# Patient Record
Sex: MALE | Race: WHITE
[De-identification: names, ages, dates, MRNs, and addresses within clinical notes are randomized per-mention and may not be internally consistent; named-entity substitution may affect disease eponyms.]

---

## 2017-04-16 ENCOUNTER — HOSPITAL ENCOUNTER (EMERGENCY)
Dept: HOSPITAL 26 - MED | Age: 62
Discharge: HOME | End: 2017-04-16
Payer: MEDICAID

## 2017-04-16 VITALS — DIASTOLIC BLOOD PRESSURE: 90 MMHG | SYSTOLIC BLOOD PRESSURE: 148 MMHG

## 2017-04-16 VITALS — SYSTOLIC BLOOD PRESSURE: 135 MMHG | DIASTOLIC BLOOD PRESSURE: 81 MMHG

## 2017-04-16 VITALS — BODY MASS INDEX: 23.05 KG/M2 | HEIGHT: 64 IN | WEIGHT: 135 LBS

## 2017-04-16 DIAGNOSIS — F17.210: ICD-10-CM

## 2017-04-16 DIAGNOSIS — L02.414: Primary | ICD-10-CM

## 2018-04-09 ENCOUNTER — HOSPITAL ENCOUNTER (EMERGENCY)
Dept: HOSPITAL 1 - ED | Age: 63
LOS: 1 days | Discharge: HOME | End: 2018-04-10
Payer: MEDICAID

## 2018-04-09 VITALS — WEIGHT: 149.98 LBS | BODY MASS INDEX: 25.61 KG/M2 | HEIGHT: 64.02 IN

## 2018-04-09 DIAGNOSIS — H66.91: Primary | ICD-10-CM

## 2018-04-09 DIAGNOSIS — F11.10: ICD-10-CM

## 2018-04-09 DIAGNOSIS — R42: ICD-10-CM

## 2018-04-09 DIAGNOSIS — R51: ICD-10-CM

## 2018-04-10 VITALS — SYSTOLIC BLOOD PRESSURE: 169 MMHG | DIASTOLIC BLOOD PRESSURE: 82 MMHG

## 2019-09-13 ENCOUNTER — HOSPITAL ENCOUNTER (EMERGENCY)
Dept: HOSPITAL 26 - MED | Age: 64
Discharge: HOME | End: 2019-09-13
Payer: MEDICAID

## 2019-09-13 VITALS — SYSTOLIC BLOOD PRESSURE: 186 MMHG | DIASTOLIC BLOOD PRESSURE: 114 MMHG

## 2019-09-13 VITALS — WEIGHT: 142 LBS | BODY MASS INDEX: 26.81 KG/M2 | HEIGHT: 61 IN

## 2019-09-13 VITALS — SYSTOLIC BLOOD PRESSURE: 179 MMHG | DIASTOLIC BLOOD PRESSURE: 87 MMHG

## 2019-09-13 DIAGNOSIS — F17.210: ICD-10-CM

## 2019-09-13 DIAGNOSIS — I10: Primary | ICD-10-CM

## 2019-09-13 LAB
ALBUMIN FLD-MCNC: 3.6 G/DL (ref 3.4–5)
ANION GAP SERPL CALCULATED.3IONS-SCNC: 13 MMOL/L (ref 8–16)
AST SERPL-CCNC: 33 U/L (ref 15–37)
BASOPHILS # BLD AUTO: 0 K/UL (ref 0–0.22)
BASOPHILS NFR BLD AUTO: 0.9 % (ref 0–2)
BILIRUB SERPL-MCNC: 0.3 MG/DL (ref 0–1)
BUN SERPL-MCNC: 15 MG/DL (ref 7–18)
CHLORIDE SERPL-SCNC: 103 MMOL/L (ref 98–107)
CO2 SERPL-SCNC: 30.2 MMOL/L (ref 21–32)
CREAT SERPL-MCNC: 1 MG/DL (ref 0.7–1.3)
EOSINOPHIL # BLD AUTO: 0 K/UL (ref 0–0.4)
EOSINOPHIL NFR BLD AUTO: 0.5 % (ref 0–4)
ERYTHROCYTE [DISTWIDTH] IN BLOOD BY AUTOMATED COUNT: 14.6 % (ref 11.6–13.7)
GFR SERPL CREATININE-BSD FRML MDRD: 97 ML/MIN (ref 90–?)
GLUCOSE SERPL-MCNC: 122 MG/DL (ref 74–106)
HCT VFR BLD AUTO: 40 % (ref 36–52)
HGB BLD-MCNC: 13 G/DL (ref 12–18)
LYMPHOCYTES # BLD AUTO: 1.2 K/UL (ref 2–11.5)
LYMPHOCYTES NFR BLD AUTO: 26 % (ref 20.5–51.1)
MCH RBC QN AUTO: 28 PG (ref 27–31)
MCHC RBC AUTO-ENTMCNC: 33 G/DL (ref 33–37)
MCV RBC AUTO: 86.5 FL (ref 80–94)
MONOCYTES # BLD AUTO: 0.5 K/UL (ref 0.8–1)
MONOCYTES NFR BLD AUTO: 10.8 % (ref 1.7–9.3)
NEUTROPHILS # BLD AUTO: 2.8 K/UL (ref 1.8–7.7)
NEUTROPHILS NFR BLD AUTO: 61.8 % (ref 42.2–75.2)
PLATELET # BLD AUTO: 227 K/UL (ref 140–450)
POTASSIUM SERPL-SCNC: 4.2 MMOL/L (ref 3.5–5.1)
RBC # BLD AUTO: 4.62 MIL/UL (ref 4.2–6.1)
SODIUM SERPL-SCNC: 142 MMOL/L (ref 136–145)
WBC # BLD AUTO: 4.5 K/UL (ref 4.8–10.8)

## 2019-09-13 PROCEDURE — 85025 COMPLETE CBC W/AUTO DIFF WBC: CPT

## 2019-09-13 PROCEDURE — 99283 EMERGENCY DEPT VISIT LOW MDM: CPT

## 2019-09-13 PROCEDURE — 36415 COLL VENOUS BLD VENIPUNCTURE: CPT

## 2019-09-13 PROCEDURE — 80053 COMPREHEN METABOLIC PANEL: CPT

## 2019-09-13 PROCEDURE — 96374 THER/PROPH/DIAG INJ IV PUSH: CPT

## 2019-09-13 PROCEDURE — 84484 ASSAY OF TROPONIN QUANT: CPT

## 2019-09-13 PROCEDURE — 96375 TX/PRO/DX INJ NEW DRUG ADDON: CPT

## 2020-12-22 ENCOUNTER — HOSPITAL ENCOUNTER (EMERGENCY)
Dept: HOSPITAL 26 - MED | Age: 65
Discharge: HOME | End: 2020-12-22
Payer: MEDICAID

## 2020-12-22 VITALS — WEIGHT: 150 LBS | BODY MASS INDEX: 24.99 KG/M2 | HEIGHT: 65 IN

## 2020-12-22 VITALS — SYSTOLIC BLOOD PRESSURE: 169 MMHG | DIASTOLIC BLOOD PRESSURE: 92 MMHG

## 2020-12-22 VITALS — SYSTOLIC BLOOD PRESSURE: 185 MMHG | DIASTOLIC BLOOD PRESSURE: 92 MMHG

## 2020-12-22 DIAGNOSIS — I10: ICD-10-CM

## 2020-12-22 DIAGNOSIS — L02.413: Primary | ICD-10-CM

## 2020-12-22 PROCEDURE — 90471 IMMUNIZATION ADMIN: CPT

## 2020-12-22 PROCEDURE — 10060 I&D ABSCESS SIMPLE/SINGLE: CPT

## 2020-12-22 PROCEDURE — 90715 TDAP VACCINE 7 YRS/> IM: CPT

## 2020-12-22 PROCEDURE — 99283 EMERGENCY DEPT VISIT LOW MDM: CPT

## 2020-12-22 NOTE — NUR
Patient discharged with v/s stable. Written and verbal after care instructions 
given and explained. 

Patient alert, oriented and verbalized understanding of instructions. 
Ambulatory with steady gait. All questions addressed prior to discharge. ID 
band removed. Patient advised to follow up with PMD. Rx of BACTRIM, AMLODIPINE 
given. Patient educated on indication of medication including possible reaction 
and side effects. Opportunity to ask questions provided and answered.

## 2021-02-15 ENCOUNTER — HOSPITAL ENCOUNTER (EMERGENCY)
Dept: HOSPITAL 26 - MED | Age: 66
Discharge: HOME | End: 2021-02-15
Payer: MEDICAID

## 2021-02-15 VITALS — HEIGHT: 63 IN | BODY MASS INDEX: 28.35 KG/M2 | WEIGHT: 160 LBS

## 2021-02-15 VITALS — SYSTOLIC BLOOD PRESSURE: 180 MMHG | DIASTOLIC BLOOD PRESSURE: 94 MMHG

## 2021-02-15 DIAGNOSIS — F12.10: ICD-10-CM

## 2021-02-15 DIAGNOSIS — I10: Primary | ICD-10-CM

## 2021-02-15 DIAGNOSIS — F17.210: ICD-10-CM

## 2021-02-15 DIAGNOSIS — Z76.0: ICD-10-CM

## 2021-02-15 DIAGNOSIS — Z71.6: ICD-10-CM

## 2021-02-15 NOTE — NUR
Patient discharged with v/s stable. Written and verbal after care instructions 
ABOUT  HYPERTENSION AND ED MEDICATION REFILL given and explained. 

Patient alert, oriented and verbalized understanding of instructions. 
Ambulatory with steady gait. All questions addressed prior to discharge. ID 
band removed. Patient advised to follow up with PMD. Rx of AMLODIPINE given. 
Patient educated on indication of medication including possible reaction and 
side effects. Opportunity to ask questions provided and answered.

## 2021-02-15 NOTE — NUR
65 YEAR OLD MALE PRESENTED TO ER FOR PRESCRIPTION REFILL; CURRENTLY SEEKING NEW 
PRIMARY PHYSICIAN. DENIES HEADACHE, CHEST PAIN, SOB. AO4, BREATHING EVEN AND 
UNLABORED, SKIN WARM AND DRY. BED IN LOWEST POSITION, LOCKED, X1 SIDERAIL UP.



PMH - HTN



NKA

## 2021-08-15 ENCOUNTER — HOSPITAL ENCOUNTER (EMERGENCY)
Dept: HOSPITAL 26 - MED | Age: 66
Discharge: HOME | End: 2021-08-15
Payer: MEDICAID

## 2021-08-15 VITALS — HEIGHT: 64 IN | BODY MASS INDEX: 26.63 KG/M2 | WEIGHT: 156 LBS

## 2021-08-15 VITALS — SYSTOLIC BLOOD PRESSURE: 152 MMHG | DIASTOLIC BLOOD PRESSURE: 96 MMHG

## 2021-08-15 VITALS — SYSTOLIC BLOOD PRESSURE: 160 MMHG | DIASTOLIC BLOOD PRESSURE: 101 MMHG

## 2021-08-15 DIAGNOSIS — I10: ICD-10-CM

## 2021-08-15 DIAGNOSIS — B85.0: Primary | ICD-10-CM

## 2021-08-15 DIAGNOSIS — Z79.899: ICD-10-CM

## 2021-08-15 NOTE — NUR
65 YO/M BIB SELF W CO TINY ITCHY ORANGE DOTS THROUGHOUT SCALP X2DAYS. PATIENT 
DENIES ANY PAIN. SCRATCHES AND PIN POINT SCABS NOTED THROUGHOUT SCALP. DENIES 
ANY INJURIES. PATIENT SITTING IN BED LOCKED IN LOWEST POSITION, X1 SIDERAIL UP 
FOR PATIENT SAFETY. BREATHING EVEN AND UNLABORED. WILL CONTINUE TO MONITOR. 





PMH: HTN, ARTHRITIS

NKA

## 2021-09-11 ENCOUNTER — HOSPITAL ENCOUNTER (EMERGENCY)
Dept: HOSPITAL 26 - MED | Age: 66
Discharge: HOME | End: 2021-09-11
Payer: MEDICAID

## 2021-09-11 VITALS — SYSTOLIC BLOOD PRESSURE: 216 MMHG | DIASTOLIC BLOOD PRESSURE: 106 MMHG

## 2021-09-11 VITALS — WEIGHT: 160 LBS | HEIGHT: 64 IN | BODY MASS INDEX: 27.31 KG/M2

## 2021-09-11 VITALS — SYSTOLIC BLOOD PRESSURE: 210 MMHG | DIASTOLIC BLOOD PRESSURE: 105 MMHG

## 2021-09-11 DIAGNOSIS — B85.0: Primary | ICD-10-CM

## 2021-09-11 NOTE — NUR
Patient discharged with v/s stable. Written and verbal after care instructions 
given and explained. 

Patient alert, oriented and verbalized understanding of instructions. 
Ambulatory with steady gait. All questions addressed prior to discharge. ID 
band removed. Patient advised to follow up with PMD. Rx of Spinosad given. 
Patient educated on indication of medication including possible reaction and 
side effects. Opportunity to ask questions provided and answered.

## 2021-11-06 ENCOUNTER — HOSPITAL ENCOUNTER (EMERGENCY)
Dept: HOSPITAL 26 - MED | Age: 66
Discharge: HOME | End: 2021-11-06
Payer: MEDICAID

## 2021-11-06 VITALS — DIASTOLIC BLOOD PRESSURE: 91 MMHG | SYSTOLIC BLOOD PRESSURE: 194 MMHG

## 2021-11-06 VITALS — WEIGHT: 162 LBS | HEIGHT: 64 IN | BODY MASS INDEX: 27.66 KG/M2

## 2021-11-06 DIAGNOSIS — I10: ICD-10-CM

## 2021-11-06 DIAGNOSIS — F17.200: ICD-10-CM

## 2021-11-06 DIAGNOSIS — G51.0: Primary | ICD-10-CM

## 2021-11-06 NOTE — NUR
Patient discharged with v/s stable. Written and verbal after care instructions 
given and explained. 

Patient alert, oriented and verbalized understanding of instructions. 
Ambulatory with steady gait. All questions addressed prior to discharge. ID 
band removed. Patient advised to follow up with PMD. Rx of DEXTRAN 
HYPROMELLOSE, PREDNISONE given. Patient educated on indication of medication 
including possible reaction and side effects. Opportunity to ask questions 
provided and answered.

## 2021-11-06 NOTE — NUR
66/M BIB SELF STATING UNABLE TO CLOSE LEFT EYE X YESTERDAY. PATIENT STATES FOR 
ONE WEEK HE HAD CONSTANT PAIN ON THE BACK OF NECK. PT DENIES PAIN AT THIS TIME. 
DENIES CHEST PAIN, SOB, DIZZINESS, HA, NAUSEA AND VOMITING. EQUAL STRENGHT 
BILATERAL EXTREMITIES. MILD FACIAL DROOPING TO LEFT SIDE FACE. PT AMBULATED 
WITH STEADY GAIT. PT ON CARDAIC MONITOR. UNLABORED AND EQUAL RESPIRATIONS. 



MEDHX: HTN

ALLERGIES: DENIES

## 2021-11-28 ENCOUNTER — HOSPITAL ENCOUNTER (EMERGENCY)
Dept: HOSPITAL 26 - MED | Age: 66
Discharge: HOME | End: 2021-11-28
Payer: MEDICAID

## 2021-11-28 VITALS — SYSTOLIC BLOOD PRESSURE: 150 MMHG | DIASTOLIC BLOOD PRESSURE: 90 MMHG

## 2021-11-28 VITALS — HEIGHT: 60 IN | WEIGHT: 158 LBS | BODY MASS INDEX: 31.02 KG/M2

## 2021-11-28 DIAGNOSIS — I10: ICD-10-CM

## 2021-11-28 DIAGNOSIS — Z79.899: ICD-10-CM

## 2021-11-28 DIAGNOSIS — L30.9: Primary | ICD-10-CM

## 2021-11-28 NOTE — NUR
Patient discharged with v/s stable. Written and verbal after care instructions 
given and explained. 

Patient alert, oriented and verbalized understanding of instructions. 
Ambulatory with steady gait. All questions addressed prior to discharge. ID 
band removed. Patient advised to follow up with PMD. Rx of benadryl itch 
stopping cream given. Patient educated on indication of medication including 
possible reaction and side effects. Opportunity to ask questions provided and 
answered.

## 2022-06-06 ENCOUNTER — HOSPITAL ENCOUNTER (EMERGENCY)
Dept: HOSPITAL 26 - MED | Age: 67
Discharge: HOME | End: 2022-06-06
Payer: MEDICARE

## 2022-06-06 VITALS — HEIGHT: 64 IN | BODY MASS INDEX: 28.68 KG/M2 | WEIGHT: 168 LBS

## 2022-06-06 VITALS — DIASTOLIC BLOOD PRESSURE: 96 MMHG | SYSTOLIC BLOOD PRESSURE: 132 MMHG

## 2022-06-06 DIAGNOSIS — Z79.899: ICD-10-CM

## 2022-06-06 DIAGNOSIS — Z79.891: ICD-10-CM

## 2022-06-06 DIAGNOSIS — I10: ICD-10-CM

## 2022-06-06 DIAGNOSIS — M54.50: Primary | ICD-10-CM

## 2022-06-06 DIAGNOSIS — Z79.1: ICD-10-CM

## 2022-06-06 PROCEDURE — 96372 THER/PROPH/DIAG INJ SC/IM: CPT

## 2022-06-06 PROCEDURE — 99283 EMERGENCY DEPT VISIT LOW MDM: CPT

## 2022-06-06 NOTE — NUR
67/M PRESENTS TO ED WITH C/O CHRONIC BACK PAIN, STATES PCP GAVE RX OF UNKNOWN 
PAIN MEDICATION BUT REPORTS NO RELIEF. PATIENT ALSO C/O CHRONIC MOUTH PAIN AND 
FACE SWELLING D/T CRACKED TEETH, STATES "I DONT TAKE CARE OF MY TEETH." PATIENT 
STATES HE HAS BEEN UNABLE TO FIND A DENTIST. PATIENT LEFT FACE APPEARS SLIGHTLY 
SWOLLEN, NO SIGNS OF RESPIRATORY DISTRESS. DENIES TAKING MEDICATION TODAY FOR 
PAIN.

## 2022-06-06 NOTE — NUR
Patient discharged with v/s stable. Written and verbal after care instructions 
FOR ACUTE BACK PAIN given and explained. 

Patient alert, oriented and verbalized understanding of instructions. 
Ambulatory with steady gait. All questions addressed prior to discharge. ID 
band removed. Patient advised to follow up with PMD. Rx of SOMA , NAPROSYN, AND 
ULTRAM given.  Opportunity to ask questions provided and answered.

## 2022-09-30 ENCOUNTER — HOSPITAL ENCOUNTER (EMERGENCY)
Dept: HOSPITAL 26 - MED | Age: 67
Discharge: HOME | End: 2022-09-30
Payer: MEDICARE

## 2022-09-30 VITALS — HEIGHT: 62 IN | WEIGHT: 155 LBS | BODY MASS INDEX: 28.52 KG/M2

## 2022-09-30 VITALS — DIASTOLIC BLOOD PRESSURE: 88 MMHG | SYSTOLIC BLOOD PRESSURE: 149 MMHG

## 2022-09-30 DIAGNOSIS — I10: Primary | ICD-10-CM

## 2022-09-30 DIAGNOSIS — Z79.899: ICD-10-CM
